# Patient Record
Sex: FEMALE | Race: WHITE
[De-identification: names, ages, dates, MRNs, and addresses within clinical notes are randomized per-mention and may not be internally consistent; named-entity substitution may affect disease eponyms.]

---

## 2019-05-25 ENCOUNTER — HOSPITAL ENCOUNTER (OUTPATIENT)
Dept: HOSPITAL 10 - OBT | Age: 34
Discharge: HOME | End: 2019-05-25
Attending: OBSTETRICS & GYNECOLOGY
Payer: COMMERCIAL

## 2019-05-25 VITALS
BODY MASS INDEX: 31.14 KG/M2 | HEIGHT: 66 IN | WEIGHT: 193.79 LBS | WEIGHT: 193.79 LBS | BODY MASS INDEX: 31.14 KG/M2 | HEIGHT: 66 IN

## 2019-05-25 VITALS — HEART RATE: 83 BPM | RESPIRATION RATE: 20 BRPM | DIASTOLIC BLOOD PRESSURE: 54 MMHG | SYSTOLIC BLOOD PRESSURE: 96 MMHG

## 2019-05-25 DIAGNOSIS — R10.9: ICD-10-CM

## 2019-05-25 DIAGNOSIS — Z3A.24: ICD-10-CM

## 2019-05-25 DIAGNOSIS — M54.9: ICD-10-CM

## 2019-05-25 DIAGNOSIS — O26.892: Primary | ICD-10-CM

## 2019-05-25 PROCEDURE — 76817 TRANSVAGINAL US OBSTETRIC: CPT

## 2019-05-25 PROCEDURE — 76815 OB US LIMITED FETUS(S): CPT

## 2019-05-25 PROCEDURE — G0463 HOSPITAL OUTPT CLINIC VISIT: HCPCS

## 2019-05-25 PROCEDURE — 81001 URINALYSIS AUTO W/SCOPE: CPT

## 2019-05-25 PROCEDURE — 85025 COMPLETE CBC W/AUTO DIFF WBC: CPT

## 2019-05-25 NOTE — PN
Triage Information


Date/Time





Reason for visit:  Back pain and abdominal cramp


Weeks of Gestation


24 weeks


/Para


-0-0-3


Diabetes:  none





Objective





Vital Signs


  Date      Temp  Pulse  Resp  B/P (MAP)   Pulse Ox  O2          O2 Flow    FiO2


Time                                                 Delivery    Rate


   19  98.1     83    20  96/54 (68)            Room Air


     14:14





Fetal Heart Rate:  130's





Results/Medications


Result Diagram:  


19 1400





Results 24 hrs





Laboratory Tests


               Test
                                19
14:00


               White Blood Count                           8.2  #


               Red Blood Count                            3.63  L


               Hemoglobin                                 10.8  L


               Hematocrit                                 33.2  L


               Mean Corpuscular Volume                     91.5


               Mean Corpuscular Hemoglobin                 29.8


               Mean Corpuscular Hemoglobin
Concent        32.5  



               Red Cell Distribution Width                 13.5


               Platelet Count                               271


               Mean Platelet Volume                        9.8  #


               Immature Granulocytes %                   0.500  H


               Neutrophils %                               68.8


               Lymphocytes %                               18.7


               Monocytes %                                  6.9


               Eosinophils %                                4.7


               Basophils %                                  0.4


               Nucleated Red Blood Cells %                  0.0


               Immature Granulocytes #                   0.040  H


               Neutrophils #                                5.6


               Lymphocytes #                                1.5


               Monocytes #                                  0.6


               Eosinophils #                                0.4


               Basophils #                                  0.0


               Nucleated Red Blood Cells #                  0.0


               Urine Color                          YELLOW


               Urine Clarity                        CLOUDY  A


               Urine pH                                     5.0


               Urine Specific Gravity                     1.024


               Urine Ketones                        NEGATIVE


               Urine Nitrite                        NEGATIVE


               Urine Bilirubin                      NEGATIVE


               Urine Urobilinogen                   NEGATIVE


               Urine Leukocyte Esterase             TRACE  A


               Urine Microscopic RBC                          2


               Urine Microscopic WBC                         6  H


               Urine Squamous Epithelial
Cells      MANY  A



               Urine Bacteria                       FEW  A


               Urine Mucus                          FEW  A


               Urine Hemoglobin                     NEGATIVE


               Urine Glucose                        NEGATIVE


               Urine Total Protein                  NEGATIVE





Disposition:  Discharge


Assessment/Plan


33 years old -0-0-3 with single intrauterine pregnancy at 24 weeks with a 


TALA of 9/10/2019 complaining of back pain and abdominal cramp.


She states good fetal movement.  She denies nausea, vomiting, shortness of 


breath, chest pain, headache, visual changes, vaginal bleeding or LOF.      


-FHR: No sign of fetal metabolic acidosis- Category I


-Contractions: None


-Ultrasound performed: Normal KAVON, maximum vertical pocket 3.6 cm.  Cervical 


length 3.9 cm


-Symptoms and sign of  labor, preeclampsia, fetal kick count discussed 


with patient, she voiced understanding.  All of her questions answered. 


-Patient was discharged home in stable condition with the appropriate discharge 


instructions provided. I would like patient to have close follow-up with her 


primary physician or outpatient clinic in 1-2 days or return to triage for 


worsening symptoms or any other urgent concerns.











EDYTA BURROUGHS                  May 25, 2019 20:41

## 2019-05-26 NOTE — TRIAGE
===================================

OB Triage

===================================

Datetime Report Generated by CPN: 05/26/2019 04:13

   

   

===========================

Datetime: 05/25/2019 17:25

===========================

   

   

===================================

Labor Evaluation

===================================

   

 Frequency:  0

 Monitor Mode:  External

 Pattern:  Normal: <= 5 Contractions in 10 Minutes

 Resting Tone [place]:  Relaxed

   

===================================

Fetal Heart Rate

===================================

   

 FHR Baseline Rate:  135

 Monitor Mode:  External US

 Variability:  Moderate 6-25 bpm

 Accelerations:  10X10

 Decelerations:  None

 Category:  Category I

   

===================================

Pain Assessment

===================================

   

 Pain Scale:  8

 Pain Presence:  Constant

 Pain Type:  Ache

 Pain Location:  Head

 Pain Goal:  3

 Pain Relief Measures:  Comfort Measures

   

===========================

Datetime: 05/25/2019 16:42

===========================

   

   

===================================

Labor Evaluation

===================================

   

 Frequency:  0

 Monitor Mode:  External

 Pattern:  Normal: <= 5 Contractions in 10 Minutes

 Resting Tone [place]:  Relaxed

   

===================================

Fetal Heart Rate

===================================

   

 FHR Baseline Rate:  145

 Monitor Mode:  External US

 Variability:  Moderate 6-25 bpm

 Accelerations:  None

 Decelerations:  None

   

===================================

Pain Assessment

===================================

   

 Pain Scale:  2

 Pain Presence:  Constant

 Pain Type:  Ache

 Pain Location:  Back

 Pain Goal:  3

 Pain Relief Measures:  Comfort Measures

   

===========================

Datetime: 05/25/2019 15:30

===========================

   

   

===================================

Labor Evaluation

===================================

   

 Frequency:  0

 Monitor Mode:  External

 Pattern:  Normal: <= 5 Contractions in 10 Minutes

 Resting Tone [place]:  Relaxed

   

===================================

Fetal Heart Rate

===================================

   

 FHR Baseline Rate:  135

 Monitor Mode:  External US

 Variability:  Moderate 6-25 bpm

 Accelerations:  10X10

 Decelerations:  None

 Category:  Category I

   

===================================

Pain Assessment

===================================

   

 Pain Scale:  0

 Pain Presence:  None/Denies

 Pain Type:  N/A

 Pain Goal:  3

 Pain Relief Measures:  Comfort Measures

   

===========================

Datetime: 05/25/2019 14:30

===========================

   

   

===================================

Fetal Heart Rate

===================================

   

 FHR Baseline Rate:  140

 FHR Baseline Changes:  No Baseline Change

 Variability:  Minimal - Undetectable to <=5 bpm

   

===================================

Pain Assessment

===================================

   

 Pain Scale:  0

 Pain Presence:  None/Denies

 Pain Relief Measures:  Comfort Measures

   

===========================

Datetime: 05/25/2019 14:16

===========================

   

 Stage of Pregnancy:  OB Triage

 Assessment Type:  Triage

   

===================================

Maternal Assessment

===================================

   

 Level of Consciousness:  Fully Conscious

 DTR's/Clonus:  DTRs 2+; No Clonus

 Headache:  Denies

 Blurred Vision:  No

 Respiratory Effort:  Unlabored; Regular Rhythm; Equal Expansion

 Breath Sounds, Left:  Clear and Equal

 Breath Sounds, Right:  Clear and Equal

 Nausea/Vomiting:  Denies

 RUQ Epigastric Pain:  Denies

 Lower Extremities Edema:  None

     Degree:  None

 Upper Extremities Edema:  None

     Degree:  None

 Facial Edema:  None

 Temperature Route:  Oral

   

===================================

Fall Risk Assessment

===================================

   

 History of Falling:  (0) No

 Secondary Diagnosis:  (0) No

 Ambulatory Aid:  (0) Bedrest/Nurse Assist

 IV Therapy:  (0) No

 Gait:  (0) Normal/Bedrest/Immobile

 Mental Status:  (0) Oriented to Own Ability

 Fall Score:  0

 Fall Risk Score Definition:  No Risk: No action required

 Monitor Mode:  External

 Monitor Mode:  External US

   

===================================

Pain Assessment

===================================

   

 Pain Scale:  0

 Pain Presence:  None/Denies

 Pain Type:  N/A

   

===================================

Vaginal Exam

===================================

   

 Dilatation (cms):  DEFFER

 Membrane Status:  Intact

   

===========================

Datetime: 05/25/2019 14:09

===========================

   

 Time of Arrival:  05/25/2019 14:09

 EGA:  24.0

 Arrived By:  Ambulatory

 Arrived From:  Home

 Chief Complaint:  back pain  AND MILD CRAMPING

 Fetal Movement:  Present

 Contractions:  Denies/Absent

 Rupture of Membranes:  Denies

 Vaginal Bleeding:  None

 Vaginal Discharge:  Denies

 Recent Sexual Intercouse:  Denies

 Patient Complaints:  Back Pain

 Additional Patient Complaints:  CBC,UA,CX LENGTH, BPP, PO HYDRATION

 Time Provider Notified:  05/25/2019 14:45

 Provider Notified:  DILIP

 Initial Plan:  MONITOR, CBC, U/A, CL, KAVON

## 2019-08-14 ENCOUNTER — HOSPITAL ENCOUNTER (OUTPATIENT)
Dept: HOSPITAL 10 - OBT | Age: 34
LOS: 1 days | Discharge: HOME | End: 2019-08-15
Attending: OBSTETRICS & GYNECOLOGY
Payer: COMMERCIAL

## 2019-08-14 ENCOUNTER — HOSPITAL ENCOUNTER (OUTPATIENT)
Dept: HOSPITAL 91 - OBT | Age: 34
LOS: 1 days | Discharge: HOME | End: 2019-08-15
Payer: COMMERCIAL

## 2019-08-14 VITALS
WEIGHT: 198.42 LBS | BODY MASS INDEX: 31.89 KG/M2 | BODY MASS INDEX: 31.89 KG/M2 | HEIGHT: 66 IN | HEIGHT: 66 IN | WEIGHT: 198.42 LBS

## 2019-08-14 VITALS — HEART RATE: 77 BPM | RESPIRATION RATE: 18 BRPM | SYSTOLIC BLOOD PRESSURE: 111 MMHG | DIASTOLIC BLOOD PRESSURE: 64 MMHG

## 2019-08-14 DIAGNOSIS — Z3A.36: ICD-10-CM

## 2019-08-14 LAB
ADD UMIC: YES
UR ASCORBIC ACID: NEGATIVE MG/DL
UR BACTERIA: (no result) /HPF
UR BILIRUBIN (DIP): NEGATIVE MG/DL
UR BLOOD (DIP): NEGATIVE MG/DL
UR CLARITY: CLEAR
UR COLOR: YELLOW
UR GLUCOSE (DIP): NEGATIVE MG/DL
UR KETONES (DIP): NEGATIVE MG/DL
UR LEUKOCYTE ESTERASE (DIP): (no result) LEU/UL
UR NITRITE (DIP): NEGATIVE MG/DL
UR PH (DIP): 7 (ref 5–9)
UR RBC: 1 /HPF (ref 0–5)
UR SPECIFIC GRAVITY (DIP): 1.01 (ref 1–1.03)
UR SQUAMOUS EPITHELIAL CELL: (no result) /HPF
UR TOTAL PROTEIN (DIP): NEGATIVE MG/DL
UR UROBILINOGEN (DIP): NEGATIVE MG/DL
UR WBC: 1 /HPF (ref 0–5)

## 2019-08-14 PROCEDURE — 36415 COLL VENOUS BLD VENIPUNCTURE: CPT

## 2019-08-14 PROCEDURE — 96360 HYDRATION IV INFUSION INIT: CPT

## 2019-08-14 PROCEDURE — 76818 FETAL BIOPHYS PROFILE W/NST: CPT

## 2019-08-14 PROCEDURE — G0463 HOSPITAL OUTPT CLINIC VISIT: HCPCS

## 2019-08-14 PROCEDURE — 81001 URINALYSIS AUTO W/SCOPE: CPT

## 2019-08-14 RX ADMIN — TERBUTALINE SULFATE 1 MG: 1 INJECTION SUBCUTANEOUS at 22:53

## 2019-08-14 RX ADMIN — PYRIDOXINE HYDROCHLORIDE 1 MLS/HR: 100 INJECTION, SOLUTION INTRAMUSCULAR; INTRAVENOUS at 23:43

## 2019-08-15 ENCOUNTER — HOSPITAL ENCOUNTER (OUTPATIENT)
Dept: HOSPITAL 91 - OBT | Age: 34
LOS: 1 days | Discharge: HOME | End: 2019-08-16
Payer: COMMERCIAL

## 2019-08-15 ENCOUNTER — HOSPITAL ENCOUNTER (OUTPATIENT)
Dept: HOSPITAL 10 - OBT | Age: 34
LOS: 1 days | Discharge: HOME | End: 2019-08-16
Attending: OBSTETRICS & GYNECOLOGY
Payer: COMMERCIAL

## 2019-08-15 VITALS — HEIGHT: 66 IN | WEIGHT: 197.75 LBS | BODY MASS INDEX: 31.78 KG/M2

## 2019-08-15 DIAGNOSIS — Z3A.36: ICD-10-CM

## 2019-08-15 PROCEDURE — G0463 HOSPITAL OUTPT CLINIC VISIT: HCPCS

## 2019-08-15 RX ADMIN — BETAMETHASONE SODIUM PHOSPHATE AND BETAMETHASONE ACETATE 1 MG: 3; 3 INJECTION, SUSPENSION INTRA-ARTICULAR; INTRALESIONAL; INTRAMUSCULAR at 01:16

## 2019-08-15 RX ADMIN — TERBUTALINE SULFATE 1 MG: 1 INJECTION SUBCUTANEOUS at 00:22

## 2019-08-15 NOTE — PN
Triage Information


Date/Time





Late entry note for exam done on 2019


Reason for visit:  Uterine contractions


Weeks of Gestation


36 weeks and 2 days


/Para


 4 para 3


Diabetes:  none


Hypertention:  none


Additional information


34-year-old G4, P3 with IUP at 36 weeks and 2 days presented with complaint of 


cramps and uterine contractions.  She was noted to be 2 cm dilated/50/-3.  


Denies any leaking of fluid or vaginal bleeding or decreased fetal movement.





Objective





Vital Signs


  Date      Temp  Pulse  Resp  B/P (MAP)   Pulse Ox  O2          O2 Flow    FiO2


Time                                                 Delivery    Rate


   19  97.7     77    18      111/64            Room Air


     22:26                           (80)








Intake and Output





8/14/19


8/14/19


8/15/19





1515:00


23:00


07:00





IntakeIntake Total


1000 ml





BalanceBalance


1000 ml











Fetal Heart Rate:  130's


Fetal Heart Rate Comments


Appropriate for gestational age and category 1


Contractions:  < 5 Minutes Apart


Exam


Appearance: Alert and oriented x4 appears to be in mild to moderate distress


Abdomen: Soft, gravid, fundal height consider gestational age, no tenderness, no


tenderness, no guarding, no rigidity


NST: Category 1 and appropriate for gestational age


Sterile vaginal examination: 2/50/-3


BPP: 


KAVON: 11.3











Laboratory Tests


                 Test
                            19
23:00


                 Urine Color                      YELLOW


                 Urine Clarity                    CLEAR


                 Urine pH                                 7.0


                 Urine Specific Gravity                 1.012


                 Urine Ketones                    NEGATIVE


                 Urine Nitrite                    NEGATIVE


                 Urine Bilirubin                  NEGATIVE


                 Urine Urobilinogen               NEGATIVE


                 Urine Leukocyte Esterase         TRACE  A


                 Urine Microscopic RBC                      1


                 Urine Microscopic WBC                      1


                 Urine Squamous Epithelial
Cells  FEW  



                 Urine Bacteria                   FEW  A


                 Urine Hemoglobin                 NEGATIVE


                 Urine Glucose                    NEGATIVE


                 Urine Total Protein              NEGATIVE








Results/Medications


Results 24 hrs





Laboratory Tests


                 Test
                            19
23:00


                 Urine Color                      YELLOW


                 Urine Clarity                    CLEAR


                 Urine pH                                 7.0


                 Urine Specific Gravity                 1.012


                 Urine Ketones                    NEGATIVE


                 Urine Nitrite                    NEGATIVE


                 Urine Bilirubin                  NEGATIVE


                 Urine Urobilinogen               NEGATIVE


                 Urine Leukocyte Esterase         TRACE  A


                 Urine Microscopic RBC                      1


                 Urine Microscopic WBC                      1


                 Urine Squamous Epithelial
Cells  FEW  



                 Urine Bacteria                   FEW  A


                 Urine Hemoglobin                 NEGATIVE


                 Urine Glucose                    NEGATIVE


                 Urine Total Protein              NEGATIVE





Disposition:  Discharge


Assessment/Plan


IUP at 36 weeks and 2 days


Cramps and uterine contractions


Status post IV hydration and terbutaline x2


Symptoms resolved


Patient was comfortable after this treatment


Received a dose of steroid


She was discharged home in stable condition with a strict  labor 


precautions  and fetal kick count and follow-up in 24 hours with a triage again 


to receive the second dose of steroid


Patient verbalized understanding.  All questions answered to patient with 


satisfaction.


Primary OB aware of the treatment plan of care.











DWIGHT JACKSON MD              Aug 15, 2019 12:52

## 2019-08-16 RX ADMIN — BETAMETHASONE SODIUM PHOSPHATE AND BETAMETHASONE ACETATE 1 MG: 3; 3 INJECTION, SUSPENSION INTRA-ARTICULAR; INTRALESIONAL; INTRAMUSCULAR at 00:58

## 2019-08-16 NOTE — TRIAGE
===================================

OB Triage

===================================

Datetime Report Generated by CPN: 2019 03:32

   

   

===========================

Datetime: 2019 01:43

===========================

   

 Stage of Pregnancy:  OB Triage

 Monitor Mode:  External

 Quality:  Mild

 Pattern:  Normal: <= 5 Contractions in 10 Minutes

 Resting Tone [place]:  Relaxed

   

===================================

Fetal Heart Rate

===================================

   

 FHR Baseline Rate:  140

 Monitor Mode:  External US

 FHR Baseline Changes:  No Baseline Change

 Variability:  Moderate 6-25 bpm

 Accelerations:  15X15

 Decelerations:  None

 Category:  Category I

   

===========================

Datetime: 2019 01:01

===========================

   

 Stage of Pregnancy:  OB Triage

 Monitor Mode:  External

 Quality:  Mild

 Pattern:  Normal: <= 5 Contractions in 10 Minutes

 Resting Tone [place]:  Relaxed

   

===================================

Fetal Heart Rate

===================================

   

 FHR Baseline Rate:  140

 Monitor Mode:  External US

 FHR Baseline Changes:  No Baseline Change

 Variability:  Moderate 6-25 bpm

 Accelerations:  15X15

 Decelerations:  None

 Category:  Category I

   

===========================

Datetime: 2019 00:06

===========================

   

 Time of Arrival:  08/15/2019 23:12

 EGA:  36.2

 Arrived By:  Ambulatory

 Arrived From:  Home

 Chief Complaint:   c/o irreg ucs since am presents w/ orders for 2nd dose of betamethasone per D
r Jaskaran

 Fetal Movement:  Present

 Contractions:  Irregular

 Time Contractions Began:  08/15/2019 08:00

 Rupture of Membranes:  Denies

 Vaginal Bleeding:  None

 Vaginal Discharge:  Denies

 Recent Sexual Intercouse:  Denies

 Abdominal Trauma:  Not Applicable

 Patient Complaints:  Cramping

 Time Provider Notified:  2019 00:05

 Provider Notified:  Dr Almendarez

 Initial Plan:  NST,BETA

   

===========================

Datetime: 2019 00:05

===========================

   

 Stage of Pregnancy:  OB Triage

 Monitor Mode:  External

 Quality:  Mild

 Pattern:  Normal: <= 5 Contractions in 10 Minutes

 Resting Tone [place]:  Relaxed

   

===================================

Fetal Heart Rate

===================================

   

 FHR Baseline Rate:  145

 FHR Baseline Changes:  No Baseline Change

 Variability:  Moderate 6-25 bpm

   

===========================

Datetime: 08/15/2019 23:45

===========================

   

 Stage of Pregnancy:  OB Triage

   

===================================

Maternal Assessment

===================================

   

 Level of Consciousness:  Keenly Alert, Responsive

 Headache:  Denies

 Blurred Vision:  No

 Respiratory Effort:  Unlabored

 Nausea/Vomiting:  Denies

 RUQ Epigastric Pain:  Denies

 Facial Edema:  None

 Monitor Mode:  External

 Resting Tone [place]:  Relaxed

   

===================================

Fetal Heart Rate

===================================

   

 FHR Baseline Rate:  150

 Monitor Mode:  External US

   

===================================

Pain Assessment

===================================

   

 Pain Scale:  7

 Pain Presence:  Intermittent

 Pain Type:  Cramping

   

===========================

Datetime: 08/15/2019 01:03

===========================

   

   

===================================

Labor Evaluation

===================================

   

 Frequency:  OCCASIONAL

 Monitor Mode:  External

 Duration (sec)2399:  40-60

 Quality:  Mild

 Resting Tone [place]:  Relaxed

   

===================================

Fetal Heart Rate

===================================

   

 FHR Baseline Rate:  145

 Monitor Mode:  External US

 Variability:  Moderate 6-25 bpm

 Accelerations:  15X15

 Decelerations:  None

 Category:  Category I

   

===========================

Datetime: 08/15/2019 00:45

===========================

   

   

===================================

Pain Assessment

===================================

   

 Pain Scale:  3

 Pain Presence:  Intermittent

 Pain Type:  Cramping

 Pain Location:  Abdomen

 Pain Goal:  3

   

===================================

Vaginal Exam

===================================

   

 Dilatation (cms):  2.0

 Effacement (%):  50

 Station:  -4

 Exam By:  EM

   

===========================

Datetime: 08/15/2019 00:00

===========================

   

   

===================================

Labor Evaluation

===================================

   

 Frequency:  7-10

 Monitor Mode:  External

 Duration (sec)2399:  50-90

 Quality:  Mild

 Resting Tone [place]:  Relaxed

   

===================================

Fetal Heart Rate

===================================

   

 FHR Baseline Rate:  145

 Monitor Mode:  External US

 Variability:  Moderate 6-25 bpm

 Accelerations:  15X15

 Decelerations:  None

 Category:  Category I

   

===========================

Datetime: 2019 23:00

===========================

   

   

===================================

Labor Evaluation

===================================

   

 Frequency:  2.5-7

 Monitor Mode:  External

 Duration (sec)2399:  40-60

 Quality:  Mild

 Resting Tone [place]:  Relaxed

   

===================================

Fetal Heart Rate

===================================

   

 FHR Baseline Rate:  145

 Monitor Mode:  External US

 Variability:  Moderate 6-25 bpm

 Accelerations:  None

 Decelerations:  None

 Category:  Category I

   

===========================

Datetime: 2019 22:35

===========================

   

   

===================================

Vaginal Exam

===================================

   

 Dilatation (cms):  2.0

 Effacement (%):  50

 Station:  -4

 Exam By:  EM

 Fetal Presentation 'A':  Unable to Assess

   

===========================

Datetime: 2019 22:23

===========================

   

 Time of Arrival:  2019 21:18

 EGA:  36.1

 Arrived By:  Ambulatory

 Arrived From:  Home

 Chief Complaint:  PT PRESENTS TO TRIAGE WITH C/O UC'S SINCE 

 Fetal Movement:  Present

 Contractions:  Regular

 Time Contractions Began:  2019 15:30

 Contractions:  2-7

 Rupture of Membranes:  Denies

 Vaginal Bleeding:  None

 Vaginal Discharge:  Denies

 Recent Sexual Intercouse:  Denies

 Abdominal Trauma:  Not Applicable

 Patient Complaints:  Contractions

 Initial Plan:  EFM

   

===========================

Datetime: 2019 22:21

===========================

   

 Stage of Pregnancy:  OB Triage

 Assessment Type:  Triage

   

===================================

Maternal Assessment

===================================

   

 Level of Consciousness:  Keenly Alert, Responsive

 DTR's/Clonus:  DTRs 2+; No Clonus

 Headache:  Denies

 Blurred Vision:  No

 Respiratory Effort:  Unlabored; Regular Rhythm; Equal Expansion

 Breath Sounds, Left:  Clear and Equal

 Breath Sounds, Right:  Clear and Equal

 Nausea/Vomiting:  Denies

 RUQ Epigastric Pain:  Denies

 Lower Extremities Edema:  None

     Degree:  None

 Upper Extremities Edema:  None

     Degree:  None

 Facial Edema:  None

 Temperature Route:  Oral

   

===================================

Fall Risk Assessment

===================================

   

 History of Falling:  (0) No

 Secondary Diagnosis:  (0) No

 Ambulatory Aid:  (0) Bedrest/Nurse Assist

 IV Therapy:  (0) No

 Gait:  (0) Normal/Bedrest/Immobile

 Mental Status:  (0) Oriented to Own Ability

 Fall Score:  0

 Fall Risk Score Definition:  No Risk: No action required

   

===================================

Pain Assessment

===================================

   

 Pain Scale:  6

 Pain Presence:  Intermittent

 Pain Type:  Contraction

 Pain Location:  Abdomen

 Pain Goal:  3

 Pain Relief Measures:  Comfort Measures

   

===========================

Datetime: 2019 22:20

===========================

   

 Contraction Comments:  TOCO APPLIED

 Comments:  US APPLIED

   

===========================

Datetime: 2019 20:01

===========================

   

 Stage of Pregnancy:  OB Triage

 Monitor Mode:  External

 Quality:  Mild

 Pattern:  Normal: <= 5 Contractions in 10 Minutes

 Resting Tone [place]:  Relaxed

   

===================================

Fetal Heart Rate

===================================

   

 FHR Baseline Rate:  145

 Monitor Mode:  External US

 FHR Baseline Changes:  No Baseline Change

 Variability:  Moderate 6-25 bpm

 Accelerations:  10X10

 Category:  Category I

   

===========================

Datetime: 2019 19:30

===========================

   

 Stage of Pregnancy:  OB Triage

   

===================================

Maternal Assessment

===================================

   

 Level of Consciousness:  Fully Conscious

 Headache:  Denies

 Blurred Vision:  No

 Nausea/Vomiting:  Denies

 RUQ Epigastric Pain:  Denies

 Facial Edema:  None

 Monitor Mode:  External

 Pattern:  Normal: <= 5 Contractions in 10 Minutes

 Resting Tone [place]:  Relaxed

   

===================================

Fetal Heart Rate

===================================

   

 FHR Baseline Rate:  145

 Monitor Mode:  External US

 FHR Baseline Changes:  No Baseline Change

 Variability:  Moderate 6-25 bpm

 Accelerations:  15X15

 Decelerations:  None

 Category:  Category I

 Pain Presence:  None/Denies

 Pain Type:  N/A

   

===========================

Datetime: 2019 14:16

===========================

   

 Fall Score:  0

 Fall Risk Score Definition:  No Risk: No action required

   

===========================

Datetime: 2019 14:09

===========================

   

 EGA:  24.4

## 2019-08-16 NOTE — PN
Triage Information


Date/Time





Reason for visit:  Uterine contractions


Weeks of Gestation


36w3d


/Para





hx of PTL


Diabetes:  none


Hypertention:  none


Additional information


here for second  dose of  BMZ


on procardia 10mg q6hrs 


gastric sleeve in 2018





Objective


Exam


VE 50/-4  yesterday


no exam today since UC's not significant





Results/Medications


Medications


BMZ


Disposition:  Discharge


Assessment/Plan


A   IUP 36w3d


     NIL


     latent phase


     second dose BMZ  given


P  discharge home with routine labor instructions


    RTH prn











DEEPTHI PEARCE MD                Aug 16, 2019 09:26